# Patient Record
(demographics unavailable — no encounter records)

---

## 2024-10-13 NOTE — ASSESSMENT
[FreeTextEntry1] : obesity - cont zepbound 5mg f/u in 2-3 months to assess response htn- controlled, cont hctz, amlodipine anxiety - refill sertraline, xanax fatigue - check testosterone leve HLD - refill statin, check flp, hepatic function

## 2024-10-13 NOTE — HISTORY OF PRESENT ILLNESS
[de-identified] : Pt f/u obesity, htn. Pt now on zepbound 5mg, tolerating well. Starting wt 216.9lbs, doing well with weight loss. Pt also improved diet, less proceessed junk foods. Now eating more fruits/veggies, balanced diet. Denies n/v, abd pain. Pt has some constipation on medication, now on probiotic to help regulate BMs. Trying to walk more now.  Pt also f/u anxiety, hld, due to refill meds

## 2024-10-13 NOTE — HISTORY OF PRESENT ILLNESS
[de-identified] : Pt f/u obesity, htn. Pt now on zepbound 5mg, tolerating well. Starting wt 216.9lbs, doing well with weight loss. Pt also improved diet, less proceessed junk foods. Now eating more fruits/veggies, balanced diet. Denies n/v, abd pain. Pt has some constipation on medication, now on probiotic to help regulate BMs. Trying to walk more now.  Pt also f/u anxiety, hld, due to refill meds

## 2024-12-20 NOTE — ASSESSMENT
[FreeTextEntry1] : obesity - incresae zepbound tp 7.5mg f/u in 2-3 months to assess response htn- controlled, cont hctz, amlodipine anxiety - beatriz off sertraline, start bupropion. f/u in 2-3 mos. refill xanax elev testosterone - check testosterone level thrush - nystatin swish/swallow

## 2024-12-20 NOTE — HISTORY OF PRESENT ILLNESS
[de-identified] : Pt f/u obesity, htn. Pt now on zepbound 5mg, tolerating well. Starting wt 216.9lbs, doing well with weight loss. Pt also improved diet, less proceessed junk foods. Now eating more fruits/veggies, balanced diet. Denies n/v, abd pain. Pt has some constipation on medication, now on probiotic to help regulate BMs. Trying to walk more now. recently wt los has plateaued Pt also f/u anxiety, hld. Pt feels sertraline controls anxiety but feels like a "zombie" and numb when on medication. Pt was tx for sinus infx 3 weeks ago was seen in [facility] was tx w/ doxy. Since then pt has had white discharge and back of throat.  Pt had elevated testesterone level but was supplementing oral supplement. Pt stopped and due to recheck ivory

## 2024-12-20 NOTE — PHYSICAL EXAM
[Soft] : abdomen soft [Non Tender] : non-tender [Non-distended] : non-distended [Normal] : affect was normal and insight and judgment were intact [de-identified] : white coating on tongue